# Patient Record
Sex: FEMALE | Race: WHITE | NOT HISPANIC OR LATINO | Employment: OTHER | URBAN - METROPOLITAN AREA
[De-identification: names, ages, dates, MRNs, and addresses within clinical notes are randomized per-mention and may not be internally consistent; named-entity substitution may affect disease eponyms.]

---

## 2024-05-22 ENCOUNTER — TELEPHONE (OUTPATIENT)
Dept: OPHTHALMOLOGY | Facility: CLINIC | Age: 69
End: 2024-05-22

## 2024-05-22 NOTE — TELEPHONE ENCOUNTER
"Called patient to follow up on below nurse triage.  She will call registration to get registered and ask insurance questions, and then she will make appt next available with Dr. Bartholomew or Dr. Salgado.    Carole Rudolph, COT 12:22 PM 05/22/2024      Ann Mccartney, RN     KK    5/22/24 11:16 AM  Note  Nurse Triage SBAR     Is this a 2nd Level Triage? NO     Situation:  Calling for second opinion- corneal specialist . 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     -Generally AM pain lasts less than a minute  -Now lasted for an hour, and visual changes occur and typically resolve within minutes.     -Wears eye protection and uses drops conscientiously- the drops she uses sodium chloride hypertonic eye gtts  -The gel used is Systane night gel.     -No systemic autoimmune disease noted previously, she did have abnormal thyroid function per, she had surgery on thyroid in January, took right side only, and now thyroid is returned to normal, per patient                 Background:As provided by patient     Assessment:Patient seeking second opinion / cornea     Protocol Recommended Disposition: see next available, eye pain has been chronic, seeking second opinion, no visual changes( short term blurriness, resolves within minutes)        Recommendation: sent to clinic for scheduling, corneal specialist desired. " Reviewed with scheduling staff that would need records/ insurance/ referral as per scheduling protocols.         Reason for Disposition   MILD eye pains are a recurrent problem    Additional Information   Negative: Followed an eye injury   Negative: Eye pain from chemical in the eye   Negative: Eye pain from foreign body in eye   Negative: Has sinus pain or pressure   Negative: SEVERE eye pain   Negative: Complete loss of vision in one or both eyes   Negative: Eyelids are very swollen (shut or almost) and fever   Negative: Eyelid (outer) is very red and fever   Negative: Foreign body sensation ('feels like something is in there') and irrigation didn't help   Negative: Vomiting   Negative: Ulcer or sore seen on the cornea (clear center part of the eye)   Negative: Recent eye surgery and increasing eye pain   Negative: Blurred vision and new or worsening   Negative: Patient sounds very sick or weak to the triager   Negative: MODERATE eye pain or discomfort (e.g., interferes with normal activities or awakens from sleep; more than mild)   Negative: Looking at light causes MODERATE to SEVERE eye pain (i.e., photophobia)   Negative: Eyelids are very swollen (shut or almost) and no fever   Negative: Painful rash near eye and multiple small blisters grouped together   Negative: Pain followed bright light exposure from welding   Negative: Mild eye pain caused by sunscreen, smoke, smog, chlorine, food, soap or other mild irritant and no blurred vision   Negative: Mild eye pain caused by sun lamp or excessive sun exposure and no blurred vision   Negative: Mild eye pain caused by staring at computer screen   Negative: MILD eye pain and present < 24 hours   Negative: Mild eye pain caused by wearing contact lenses and no blurred vision   Negative: Mild pain with foreign body sensation ('feels like something is in there') and has not attempted irrigation    Protocols used: Eye Pain and Other Symptoms-[facility]ARD[facility]  Artur ENRIQUEZ    5/22/24 10:50 AM  Note  Caller reporting the following red-flag symptom(s): right eye pain      Per the system red-flag symptom policy, patient was instructed to:  speak with a Registered Nurse     Action:  Patient warm transferred to a Registered Nurse              MP    5/22/24 10:49 AM  Whitney Bob Steven